# Patient Record
Sex: FEMALE | Race: OTHER | NOT HISPANIC OR LATINO
[De-identification: names, ages, dates, MRNs, and addresses within clinical notes are randomized per-mention and may not be internally consistent; named-entity substitution may affect disease eponyms.]

---

## 2019-05-01 PROBLEM — Z00.00 ENCOUNTER FOR PREVENTIVE HEALTH EXAMINATION: Status: ACTIVE | Noted: 2019-05-01

## 2019-05-23 ENCOUNTER — APPOINTMENT (OUTPATIENT)
Dept: SURGERY | Facility: CLINIC | Age: 29
End: 2019-05-23
Payer: COMMERCIAL

## 2019-05-23 VITALS
OXYGEN SATURATION: 98 % | HEIGHT: 62.5 IN | BODY MASS INDEX: 36.26 KG/M2 | WEIGHT: 202.13 LBS | DIASTOLIC BLOOD PRESSURE: 82 MMHG | SYSTOLIC BLOOD PRESSURE: 128 MMHG | HEART RATE: 99 BPM | TEMPERATURE: 99 F

## 2019-05-23 PROCEDURE — 99203 OFFICE O/P NEW LOW 30 MIN: CPT

## 2019-05-26 ENCOUNTER — TRANSCRIPTION ENCOUNTER (OUTPATIENT)
Age: 29
End: 2019-05-26

## 2019-06-06 NOTE — ASSESSMENT
[FreeTextEntry1] : Case discussed with attending surgeon. The patient is a 28 year old female who presents for non symptomatic cholelithiasis evaluation. She should continue to work on weight management as she has been trying to loose weight. She is aware that due to her childbearing age, the symptoms associated with cholelithiasis can be exacerbated during pregnancy. She will let us know if there are symptoms such as fevers, chills or vomiting. She can have elective cholecystectomy with cholangiogram. We discussed the risks, benefits and alternatives to cholecystectomy with cholangiogram including but not limited to bleeding, infection, death, disability, fistula, skin issues, blood clots, cardiac and pulmonary issues, recurrence and other issues. I answered all questions. Notably greater than 50% of today's 30 minute initial visit was spent on counseling and coordination of care.\par \par Surgery Attending: I did see/examine or discuss this patient with surgical PA. I agree with outlined assessment and plan. I answered all questions. We discussed the risks, benefits, and alternatives to laparoscopic cholecystectomy in detail including but not limited to bleeding, infection, death, disability, hernia, retained stones, need for additional procedures, bile duct injury, bile leak and other issues. I answered all questions.\par \par \par

## 2019-06-06 NOTE — PHYSICAL EXAM
[Normal Breath Sounds] : Normal breath sounds [Normal Heart Sounds] : normal heart sounds [Alert] : alert [Oriented to Person] : oriented to person [Oriented to Place] : oriented to place [Oriented to Time] : oriented to time [Calm] : calm [Tender] : was nontender [Purpura] : no purpura  [Petechiae] : no petechiae [Enlarged] : not enlarged [de-identified] : Pupils equal. No scleral icterus. NCAT. [de-identified] : NAD. Appropriate.Comfortable. [de-identified] : Supple. No overt lymphadenopathy. No JVD. [de-identified] : Abdomen is soft, nontender and non distended. Do not appreciate any overt mass. Negative Fletcher's sign\par  Yes

## 2019-06-06 NOTE — HISTORY OF PRESENT ILLNESS
[de-identified] : The patient is a 28 year old female who would like to have evaluation of RUQ pain in relation to cholelithiasis finding on recent ultrasound. She started having RUQ pain on April 5, 2019 when she took OTC gas relief but did not help. That day she also started developing right shoulder pain, chest, sweating and dizziness and was then seen in ER during which time the pain subsided and ultrasound evaluation indicated cholelithiasis (common bile duct 5mm) and hepatic steatosis. Prior to this incident she was seen by Geovanna Garza PA-C -PCP who ordered abdominal ultrasound (3-11-19) based on alk phos being elevated which indicated: distended gallbladder with 5mm gallbladder polyp and common bile duct 3mm in size. Dr. Jozef Gonzalez- GI ordered HIDA scan (4-30-19) which did not indicate any abnormalities. Recently she has been having increasing RUQ discomfort especially with food and has been doing her best to try to stay away from foods that trigger the RUQ symptoms. Ever since her ER visit she has had a total of three gallbladder attacks without fever or vomiting. Denies nausea, vomiting, fevers, chest pain and no urinary or bowel changes.

## 2019-06-17 ENCOUNTER — APPOINTMENT (OUTPATIENT)
Dept: SURGERY | Facility: CLINIC | Age: 29
End: 2019-06-17
Payer: COMMERCIAL

## 2019-06-17 VITALS
HEIGHT: 62.5 IN | DIASTOLIC BLOOD PRESSURE: 92 MMHG | HEART RATE: 90 BPM | WEIGHT: 196 LBS | SYSTOLIC BLOOD PRESSURE: 136 MMHG | BODY MASS INDEX: 35.16 KG/M2 | TEMPERATURE: 97.8 F | OXYGEN SATURATION: 97 %

## 2019-06-17 DIAGNOSIS — Z87.09 PERSONAL HISTORY OF OTHER DISEASES OF THE RESPIRATORY SYSTEM: ICD-10-CM

## 2019-06-17 DIAGNOSIS — Z86.39 PERSONAL HISTORY OF OTHER ENDOCRINE, NUTRITIONAL AND METABOLIC DISEASE: ICD-10-CM

## 2019-06-17 DIAGNOSIS — Z82.49 FAMILY HISTORY OF ISCHEMIC HEART DISEASE AND OTHER DISEASES OF THE CIRCULATORY SYSTEM: ICD-10-CM

## 2019-06-17 DIAGNOSIS — Z82.5 FAMILY HISTORY OF ASTHMA AND OTHER CHRONIC LOWER RESPIRATORY DISEASES: ICD-10-CM

## 2019-06-17 DIAGNOSIS — Z83.438 FAMILY HISTORY OF OTHER DISORDER OF LIPOPROTEIN METABOLISM AND OTHER LIPIDEMIA: ICD-10-CM

## 2019-06-17 DIAGNOSIS — Z92.89 PERSONAL HISTORY OF OTHER MEDICAL TREATMENT: ICD-10-CM

## 2019-06-17 DIAGNOSIS — Z87.440 PERSONAL HISTORY OF URINARY (TRACT) INFECTIONS: ICD-10-CM

## 2019-06-17 DIAGNOSIS — Z78.9 OTHER SPECIFIED HEALTH STATUS: ICD-10-CM

## 2019-06-17 DIAGNOSIS — Z80.0 FAMILY HISTORY OF MALIGNANT NEOPLASM OF DIGESTIVE ORGANS: ICD-10-CM

## 2019-06-17 DIAGNOSIS — Z80.42 FAMILY HISTORY OF MALIGNANT NEOPLASM OF PROSTATE: ICD-10-CM

## 2019-06-17 DIAGNOSIS — Z86.59 PERSONAL HISTORY OF OTHER MENTAL AND BEHAVIORAL DISORDERS: ICD-10-CM

## 2019-06-17 DIAGNOSIS — Z80.8 FAMILY HISTORY OF MALIGNANT NEOPLASM OF OTHER ORGANS OR SYSTEMS: ICD-10-CM

## 2019-06-17 DIAGNOSIS — Z80.1 FAMILY HISTORY OF MALIGNANT NEOPLASM OF TRACHEA, BRONCHUS AND LUNG: ICD-10-CM

## 2019-06-17 DIAGNOSIS — Z80.51 FAMILY HISTORY OF MALIGNANT NEOPLASM OF KIDNEY: ICD-10-CM

## 2019-06-17 DIAGNOSIS — Z84.1 FAMILY HISTORY OF DISORDERS OF KIDNEY AND URETER: ICD-10-CM

## 2019-06-17 DIAGNOSIS — Z81.1 FAMILY HISTORY OF ALCOHOL ABUSE AND DEPENDENCE: ICD-10-CM

## 2019-06-17 DIAGNOSIS — K21.9 GASTRO-ESOPHAGEAL REFLUX DISEASE W/OUT ESOPHAGITIS: ICD-10-CM

## 2019-06-17 DIAGNOSIS — Z86.79 PERSONAL HISTORY OF OTHER DISEASES OF THE CIRCULATORY SYSTEM: ICD-10-CM

## 2019-06-17 DIAGNOSIS — G43.909 MIGRAINE, UNSPECIFIED, NOT INTRACTABLE, W/OUT STATUS MIGRAINOSUS: ICD-10-CM

## 2019-06-17 DIAGNOSIS — Z86.19 PERSONAL HISTORY OF OTHER INFECTIOUS AND PARASITIC DISEASES: ICD-10-CM

## 2019-06-17 PROCEDURE — 99213 OFFICE O/P EST LOW 20 MIN: CPT

## 2019-06-17 RX ORDER — FLUTICASONE PROPIONATE 50 UG/1
50 SPRAY, METERED NASAL
Refills: 0 | Status: ACTIVE | COMMUNITY

## 2019-06-17 RX ORDER — DROSPIRENONE AND ETHINYL ESTRADIOL 0.03MG-3MG
KIT ORAL
Refills: 0 | Status: ACTIVE | COMMUNITY

## 2019-06-17 RX ORDER — CHOLECALCIFEROL (VITAMIN D3) 25 MCG
TABLET ORAL
Refills: 0 | Status: ACTIVE | COMMUNITY

## 2019-06-17 RX ORDER — PAROXETINE HYDROCHLORIDE 20 MG/1
20 TABLET, FILM COATED ORAL
Refills: 0 | Status: ACTIVE | COMMUNITY

## 2019-06-17 RX ORDER — SODIUM BICARBONATE, SODIUM CHLORIDE 700; 2300 MG/3G; MG/3G
POWDER, FOR SOLUTION NASAL
Refills: 0 | Status: ACTIVE | COMMUNITY

## 2019-06-17 RX ORDER — DESOGESTREL AND ETHINYL ESTRADIOL 0.15-0.03
0.15-3 KIT ORAL
Qty: 28 | Refills: 0 | Status: ACTIVE | COMMUNITY
Start: 2018-12-21

## 2019-06-17 NOTE — REVIEW OF SYSTEMS
[Chills] : no chills [Fever] : no fever [Recent Weight Gain (___ Lbs)] : recent [unfilled] ~Ulb weight gain [Feeling Poorly] : feeling poorly [Feeling Tired] : feeling tired [Earache] : no earache [Recent Weight Loss (___ Lbs)] : recent [unfilled] ~Ulb weight loss [Loss Of Hearing] : no hearing loss [Nosebleeds] : no nosebleeds [Nasal Discharge] : nasal discharge [Sore Throat] : no sore throat [Hoarseness] : no hoarseness [Heart Rate Is Slow] : the heart rate was not slow [Heart Rate Is Fast] : the heart rate was not fast [Chest Pain] : chest pain [Palpitations] : palpitations [Leg Claudication] : no intermittent leg claudication [Lower Ext Edema] : no lower extremity edema [Wheezing] : no wheezing [Shortness Of Breath] : no shortness of breath [SOB on Exertion] : no shortness of breath during exertion [Cough] : cough [Orthopnea] : no orthopnea [PND] : no PND [Abdominal Pain] : abdominal pain [Vomiting] : no vomiting [Diarrhea] : diarrhea [Constipation] : no constipation [Heartburn] : heartburn [Melena] : no melena [Dysuria] : no dysuria [Incontinence] : no incontinence [Pelvic Pain] : no pelvic pain [Dysmenorrhea] : no dysmenorrhea [Confused] : no confusion [Vaginal Discharge] : no vaginal discharge [Abn Vaginal Bleeding] : no unexplained vaginal bleeding [Convulsions] : no convulsions [Dizziness] : dizziness [Fainting] : no fainting [Limb Weakness] : no limb weakness [Difficulty Walking] : difficulty walking [Negative] : Endocrine [FreeTextEntry7] : nausea [FreeTextEntry8] : nocturia

## 2019-06-17 NOTE — PHYSICAL EXAM
[Calm] : calm [de-identified] : NCAT, no scleral icterus [de-identified] : NAD, comfortable [de-identified] : +BS soft NT ND.  No hepatosplenomegaly. [de-identified] : A&Ox3 [de-identified] : No clubbing, cyanosis, or edema. [de-identified] : Warm, dry.

## 2019-06-17 NOTE — HISTORY OF PRESENT ILLNESS
[de-identified] : Ms. Krueger presented today for follow up evaluation and management of cholelithiasis.  She had previously seen my partner Dr. Sumeet Lyon on May 23, 2019, and is scheduled for a cholecystectomy on June 28, 2019.  However, over the weekend she developed abdominal pain with eating formerly “safe” foods.  She stated the pain did not resolve except when she was sleeping (and not eating).  She denied fever, chills, nausea, vomiting, diarrhea, or constipation.  She stated she feels slightly improved today but has been significantly limiting her food intake.

## 2019-06-17 NOTE — CONSULT LETTER
[FreeTextEntry1] : 2019\par \par \par \par Patsy Allen M.D.\par [facility]\par [address]\[place] [address] \par Telephone #:  [phone]\par \par Re: Karen Krueger\par : 1990\par \par \par Dear Dr. Allen:\par \par I had the opportunity to see Ms. Krueger today for follow up evaluation and management of cholelithiasis.  She had previously seen my partner Dr. Sumeet Lyon on May 23, 2019, and is scheduled for a cholecystectomy on 2019.  However, over the weekend she developed abdominal pain with eating formerly “safe” foods.  She stated the pain did not resolve except when she was sleeping (and not eating).  She denied fever, chills, nausea, vomiting, diarrhea, or constipation.  She stated she feels slightly improved today but has been significantly limiting her food intake.\par \par On physical examination, her height is 5 feet 2.5 inches, weight is 196 pounds, and BMI 35.28.  Her temperature is 97.8 °F, blood pressure is 136/92, heart rate is 90, and O2 saturation is 97% on room air.  In general, she is a well-dressed, well-nourished woman who appears her stated age and is in no acute distress.  She is calm, alert and oriented x 3.  HEENT exam demonstrates a normocephalic atraumatic appearance with no scleral icterus.  Her abdomen has audible bowel sounds, is soft, non-tender, and non-distended.  There is no hepatosplenomegaly appreciated.  Her extremities are warm and dry without clubbing, cyanosis or edema. \par  \par I reviewed the report of the ultrasound of the abdomen that was performed on 2019, which demonstrated an approximately 5 mm gallbladder polyp.  No cholelithiasis, gallbladder wall thickening, or pericholecystic fluid.  Increased echotexture suggestive of fatty infiltration/hepatocellular disease.  Common bile duct normal measuring 3 mm.\par \par I reviewed the report of the ultrasound of the abdomen that was performed on 2019, which demonstrated cholelithiasis without cholecystitis.  Hepatic steatosis.  Common bile duct 5 mm.\par \par I reviewed the report of the HIDA with CCK that was performed on 2019, which demonstrated a normal hepatobiliary scan without evidence of cystic duct obstruction.  Normal gallbladder ejection fraction without evidence of biliary dyskinesis.\par \par In summary, Ms. Krueger is a 28-year-old woman with cholelithiasis.  As her symptoms are worsening, I recommended she consider moving the surgery up to this week instead of waiting until 2019.  We will proceed with a robotic-assisted cholecystectomy on 2019.\par \par Thank you for the opportunity to care for this patient. Please do not hesitate to contact me in the event that you have any questions or concerns about the care of this patient.\par \par Sincerely,\par \par \par \par \par Lesia Cardona M.D.\par \par CC:\par Jozef Gonzalez M.D.\par [facility] [address] Office\[address], [address]\[place] [address]\par Telephone #:  [phone]

## 2019-06-17 NOTE — ASSESSMENT
[FreeTextEntry1] : Ms. Krueger is a 28-year-old woman with cholelithiasis.  As her symptoms are worsening, I recommended she consider moving the surgery up to this week instead of waiting until June 28, 2019.  We will proceed with a robotic-assisted cholecystectomy on June 20, 2019.

## 2019-06-27 VITALS
DIASTOLIC BLOOD PRESSURE: 73 MMHG | TEMPERATURE: 98 F | HEART RATE: 94 BPM | HEIGHT: 62 IN | OXYGEN SATURATION: 97 % | RESPIRATION RATE: 16 BRPM | SYSTOLIC BLOOD PRESSURE: 124 MMHG | WEIGHT: 193.79 LBS

## 2019-06-27 NOTE — ASU PATIENT PROFILE, ADULT - PMH
Anxiety    Depression    Gallstones    HTN (hypertension)    Morbid obesity Anxiety    Depression    Gallstones

## 2019-06-28 ENCOUNTER — OUTPATIENT (OUTPATIENT)
Dept: OUTPATIENT SERVICES | Facility: HOSPITAL | Age: 29
LOS: 1 days | Discharge: HOME | End: 2019-06-28
Payer: COMMERCIAL

## 2019-06-28 ENCOUNTER — RESULT REVIEW (OUTPATIENT)
Age: 29
End: 2019-06-28

## 2019-06-28 ENCOUNTER — APPOINTMENT (OUTPATIENT)
Dept: SURGERY | Facility: HOSPITAL | Age: 29
End: 2019-06-28

## 2019-06-28 VITALS
TEMPERATURE: 99 F | OXYGEN SATURATION: 95 % | DIASTOLIC BLOOD PRESSURE: 74 MMHG | RESPIRATION RATE: 13 BRPM | SYSTOLIC BLOOD PRESSURE: 121 MMHG | HEART RATE: 88 BPM

## 2019-06-28 DIAGNOSIS — Z90.89 ACQUIRED ABSENCE OF OTHER ORGANS: Chronic | ICD-10-CM

## 2019-06-28 PROCEDURE — 88304 TISSUE EXAM BY PATHOLOGIST: CPT

## 2019-06-28 PROCEDURE — S2900 ROBOTIC SURGICAL SYSTEM: CPT | Mod: NC,82

## 2019-06-28 PROCEDURE — 47562 LAPAROSCOPIC CHOLECYSTECTOMY: CPT

## 2019-06-28 PROCEDURE — S2900: CPT

## 2019-06-28 PROCEDURE — S2900 ROBOTIC SURGICAL SYSTEM: CPT

## 2019-06-28 PROCEDURE — 47562 LAPAROSCOPIC CHOLECYSTECTOMY: CPT | Mod: 82

## 2019-06-28 RX ORDER — BUPIVACAINE 13.3 MG/ML
20 INJECTION, SUSPENSION, LIPOSOMAL INFILTRATION ONCE
Refills: 0 | Status: DISCONTINUED | OUTPATIENT
Start: 2019-06-28 | End: 2019-06-28

## 2019-06-28 RX ORDER — DOCUSATE SODIUM 100 MG
1 CAPSULE ORAL
Qty: 12 | Refills: 0
Start: 2019-06-28 | End: 2019-07-01

## 2019-06-28 RX ORDER — SODIUM CHLORIDE 9 MG/ML
1000 INJECTION, SOLUTION INTRAVENOUS
Refills: 0 | Status: DISCONTINUED | OUTPATIENT
Start: 2019-06-28 | End: 2019-06-28

## 2019-06-28 RX ORDER — OXYCODONE HYDROCHLORIDE 5 MG/1
5 TABLET ORAL ONCE
Refills: 0 | Status: DISCONTINUED | OUTPATIENT
Start: 2019-06-28 | End: 2019-06-28

## 2019-06-28 RX ORDER — HYDROMORPHONE HYDROCHLORIDE 2 MG/ML
0.5 INJECTION INTRAMUSCULAR; INTRAVENOUS; SUBCUTANEOUS ONCE
Refills: 0 | Status: DISCONTINUED | OUTPATIENT
Start: 2019-06-28 | End: 2019-06-28

## 2019-06-28 NOTE — PACU DISCHARGE NOTE - COMMENTS
alert and oriented x4- follow commands- denies Pain- ambulating to bathroom and hallway-No dizziness and lightheadedness upon standing and walking- discharge instruction explained to pt and mother of pt. Pt and mother pt verbalizes understanding-  Pt left unit via wheelchair accompanied by mother and PCA

## 2019-06-28 NOTE — BRIEF OPERATIVE NOTE - OPERATION/FINDINGS
Open Sravanthi cut down via vertical infraumbilical incision with placement of 12mm port. 3-8mm ports placed, one in LLQ and two in RLQ. Robot docked. Dissected gallbladder fat, identified large cystic duct with stones. Clipped cystic duct, two distally and one proximally, and transected between clips. Dissected superiorly, clipped small artery feeding gallbladder. Cystic artery identified, transected with electrocautery and two clips placed on artery stump. Freed gallbladder and removed via endocatch bag. Irrigated gallbladder fossa and RUQ. Closed umbilical incision with 3-0 Maxon sutures, simple interrupted. Closed skin with 2-0 Vicryl deep dermal and 4-0 Monocryl. Dressed with Dermabond.

## 2019-07-03 LAB — SURGICAL PATHOLOGY STUDY: SIGNIFICANT CHANGE UP

## 2019-07-12 PROBLEM — F32.9 MAJOR DEPRESSIVE DISORDER, SINGLE EPISODE, UNSPECIFIED: Chronic | Status: ACTIVE | Noted: 2019-06-27

## 2019-07-12 PROBLEM — K80.20 CALCULUS OF GALLBLADDER WITHOUT CHOLECYSTITIS WITHOUT OBSTRUCTION: Chronic | Status: ACTIVE | Noted: 2019-06-27

## 2019-07-12 PROBLEM — F41.9 ANXIETY DISORDER, UNSPECIFIED: Chronic | Status: ACTIVE | Noted: 2019-06-27

## 2019-07-15 ENCOUNTER — APPOINTMENT (OUTPATIENT)
Dept: SURGERY | Facility: CLINIC | Age: 29
End: 2019-07-15
Payer: COMMERCIAL

## 2019-07-15 VITALS
OXYGEN SATURATION: 98 % | HEART RATE: 91 BPM | WEIGHT: 195.31 LBS | SYSTOLIC BLOOD PRESSURE: 126 MMHG | BODY MASS INDEX: 35.04 KG/M2 | HEIGHT: 62.5 IN | DIASTOLIC BLOOD PRESSURE: 87 MMHG | TEMPERATURE: 97.6 F

## 2019-07-15 DIAGNOSIS — K80.10 CALCULUS OF GALLBLADDER WITH CHRONIC CHOLECYSTITIS W/OUT OBSTRUCTION: ICD-10-CM

## 2019-07-15 PROCEDURE — 99024 POSTOP FOLLOW-UP VISIT: CPT

## 2019-07-16 PROBLEM — K80.10 CHOLELITHIASIS WITH CHRONIC CHOLECYSTITIS: Status: ACTIVE | Noted: 2019-05-23

## 2024-08-28 NOTE — DATA REVIEWED
[Lack of understanding] : lack of understanding [FreeTextEntry1] : US abdomen (3/11/2019) - approximately 5 mm gallbladder polyp.  No cholelithiasis, gallbladder wall thickening, or pericholecystic fluid.  Increased echotexture suggestive of fatty infiltration/hepatocellular disease.  Common bile duct normal measuring 3 mm.\par \par US abdomen (4/5/2019) - cholelithiasis without cholecystitis.  Hepatic steatosis.  Common bile duct 5 mm.\par \par HIDA with CCK (4/30/2019) - normal hepatobiliary scan without evidence of cystic duct obstruction.  Normal gallbladder ejection fraction without evidence of biliary dyskinesis. [Takes medication as prescribed] : does not take